# Patient Record
Sex: FEMALE | Race: WHITE | URBAN - METROPOLITAN AREA
[De-identification: names, ages, dates, MRNs, and addresses within clinical notes are randomized per-mention and may not be internally consistent; named-entity substitution may affect disease eponyms.]

---

## 2022-12-08 ENCOUNTER — OFFICE VISIT (OUTPATIENT)
Dept: URBAN - METROPOLITAN AREA CLINIC 27 | Facility: CLINIC | Age: 32
End: 2022-12-08
Payer: COMMERCIAL

## 2022-12-08 DIAGNOSIS — B39.9 HISTOPLASMOSIS: ICD-10-CM

## 2022-12-08 DIAGNOSIS — H44.23 DEGENERATIVE MYOPIA, BILATERAL: Primary | ICD-10-CM

## 2022-12-08 PROCEDURE — 99204 OFFICE O/P NEW MOD 45 MIN: CPT | Performed by: OPHTHALMOLOGY

## 2022-12-08 PROCEDURE — 92134 CPTRZ OPH DX IMG PST SGM RTA: CPT | Performed by: OPHTHALMOLOGY

## 2022-12-08 ASSESSMENT — INTRAOCULAR PRESSURE
OS: 15
OD: 12

## 2022-12-08 NOTE — IMPRESSION/PLAN
Impression: Degenerative myopia, bilateral: H44.23.
- around -13 myope OU Plan: Exam and OCT demostnrate mild RPE changes without CNVM. No significant lattice lesions or RTs. Patient cleared from retina standpoint to proceed with ICL surgery with Dr. Esme Parson RTC RCA PRN

## 2022-12-08 NOTE — IMPRESSION/PLAN
Impression: Histoplasmosis: B39.9. Plan: Exam demonstrates few scattered CRS OD. Fortunately, no CNVM.   Observe